# Patient Record
Sex: FEMALE | Race: WHITE | NOT HISPANIC OR LATINO | URBAN - METROPOLITAN AREA
[De-identification: names, ages, dates, MRNs, and addresses within clinical notes are randomized per-mention and may not be internally consistent; named-entity substitution may affect disease eponyms.]

---

## 2019-06-18 ENCOUNTER — OUTPATIENT (OUTPATIENT)
Dept: OUTPATIENT SERVICES | Facility: HOSPITAL | Age: 69
LOS: 1 days | End: 2019-06-18
Payer: MEDICARE

## 2019-06-18 PROCEDURE — 78072 PARATHYRD PLANAR W/SPECT&CT: CPT | Mod: 26

## 2019-06-18 PROCEDURE — 78072 PARATHYRD PLANAR W/SPECT&CT: CPT

## 2019-06-18 PROCEDURE — A9500: CPT

## 2019-06-18 PROCEDURE — A9512: CPT

## 2019-09-12 PROBLEM — Z00.00 ENCOUNTER FOR PREVENTIVE HEALTH EXAMINATION: Status: ACTIVE | Noted: 2019-09-12

## 2019-09-16 ENCOUNTER — APPOINTMENT (OUTPATIENT)
Dept: OTOLARYNGOLOGY | Facility: CLINIC | Age: 69
End: 2019-09-16
Payer: MEDICARE

## 2019-09-16 VITALS
BODY MASS INDEX: 35.36 KG/M2 | OXYGEN SATURATION: 95 % | SYSTOLIC BLOOD PRESSURE: 149 MMHG | DIASTOLIC BLOOD PRESSURE: 52 MMHG | WEIGHT: 220 LBS | TEMPERATURE: 98.1 F | HEIGHT: 66 IN | HEART RATE: 69 BPM

## 2019-09-16 DIAGNOSIS — Z86.39 PERSONAL HISTORY OF OTHER ENDOCRINE, NUTRITIONAL AND METABOLIC DISEASE: ICD-10-CM

## 2019-09-16 DIAGNOSIS — Z78.9 OTHER SPECIFIED HEALTH STATUS: ICD-10-CM

## 2019-09-16 DIAGNOSIS — Z82.49 FAMILY HISTORY OF ISCHEMIC HEART DISEASE AND OTHER DISEASES OF THE CIRCULATORY SYSTEM: ICD-10-CM

## 2019-09-16 DIAGNOSIS — K21.9 ACUTE LARYNGITIS: ICD-10-CM

## 2019-09-16 DIAGNOSIS — Z86.79 PERSONAL HISTORY OF OTHER DISEASES OF THE CIRCULATORY SYSTEM: ICD-10-CM

## 2019-09-16 DIAGNOSIS — Z80.9 FAMILY HISTORY OF MALIGNANT NEOPLASM, UNSPECIFIED: ICD-10-CM

## 2019-09-16 DIAGNOSIS — J04.0 ACUTE LARYNGITIS: ICD-10-CM

## 2019-09-16 DIAGNOSIS — E21.5 DISORDER OF PARATHYROID GLAND, UNSPECIFIED: ICD-10-CM

## 2019-09-16 PROCEDURE — 76536 US EXAM OF HEAD AND NECK: CPT

## 2019-09-16 PROCEDURE — 31575 DIAGNOSTIC LARYNGOSCOPY: CPT

## 2019-09-16 PROCEDURE — 99204 OFFICE O/P NEW MOD 45 MIN: CPT | Mod: 25

## 2019-09-16 RX ORDER — LEVOTHYROXINE SODIUM 0.17 MG/1
TABLET ORAL
Refills: 0 | Status: ACTIVE | COMMUNITY

## 2019-09-16 RX ORDER — ATENOLOL 50 MG/1
TABLET ORAL
Refills: 0 | Status: ACTIVE | COMMUNITY

## 2019-09-16 RX ORDER — ESCITALOPRAM OXALATE 5 MG/1
TABLET, FILM COATED ORAL
Refills: 0 | Status: ACTIVE | COMMUNITY

## 2019-09-30 ENCOUNTER — TRANSCRIPTION ENCOUNTER (OUTPATIENT)
Age: 69
End: 2019-09-30

## 2019-10-13 NOTE — REASON FOR VISIT
[FreeTextEntry2] : a surgical consultation concerning primary hyperparathyroidism, hypercalcemia and osteoporosis. [FreeTextEntry1] : Referrer is Acacia Monreal MD Endocrinologist,  PCP is same.

## 2019-10-13 NOTE — PROCEDURE
[Image(s) Captured] : image(s) captured and filed [Unable to Cooperate with Mirror] : patient unable to cooperate with mirror [Gag Reflex] : gag reflex preventing mirror examination [Topical Lidocaine] : topical lidocaine [Flexible Endoscope] : examined with the flexible endoscope [Serial Number: ___] : Serial Number: [unfilled] [Hoarseness] : hoarseness not clearly evaluated by indirect laryngoscopy [Globus] : globus [FreeTextEntry3] : \par [facility]\par THYROID/NECK ULTRASOUND REPORT\par \par NAME: LORI LARRY.        MR# [id number]	..              : 1950..	         DATE: 19\par \par HISTORY/ INDICATIONS: 69-year-old female with biochemical evidence of primary hyperparathyroidism to rule out a parathyroid adenoma suspected on Sestamibi scan and thyroid disease.\par \par COMPARISON: None\par \par PROCEDURE: Physician performed high-resolution ultrasound gray scale imaging and color Doppler supplementation of the thyroid gland and neck was obtained in the longitudinal and transverse planes using a 13 MHz linear transducer with image capture.  All measurements are in centimeters (longitudinal x AP x transverse).  \par \par FINDINGS: Overall the thyroid gland is atrophic, hypoechoic and  heterogeneous in echotexture with numerous hypoechoic micronodules and echogenic bands.  Vascularity is minimal on color Doppler flow. \par \par RIGHT LOBE: Is atrophic, heterogeneous, with decreased vascularity on color Doppler and measures 3.24 x 0.96 x 0.91 CM.  NODULES: None. \par \par ISTHMUS: Measures 0.16 CM in AP dimension and is heterogeneous in echotexture with decreased vascularity.  No nodules are identified.\par \par LEFT LOBE:  Is atrophic, heterogeneous, with decreased vascularity on color Doppler and measures 2.81 x 0.88 x 1.00 CM. NODULES: None.\par \par PARATHYROID GLANDS: Just below the left thyroid lobe is a hypoechoic, smoothly marginated structure with an echogenic line of separation and a vascular pedicle that measures 0.91 x 0.68 x 0.64 CM.  There are no other identified enlarged parathyroid glands in the central neck compartment. \par \par LYMPH NODES: There are several benign appearing subcentimeter lymph nodes identified at neck levels III bilaterally, all with echogenic hilar lines and a short long axis ratio < 0.5 in the transverse plane.\par \par IMPRESSION: A 69-year-old female with a atrophic hypoechoic heterogeneous thyroid gland consistent with long-standing autoimmune thyroiditis. There are no identified thyroid nodules. At the lower pole of the left thyroid lobe there is a 0.91 CM oblong structure that is consistent with a parathyroid adenoma and concordant with the Sestamibi scan findings. However, a reactive lymph node can also have this appearance associated with Hashimoto's thyroiditis.\par \par RECOMMENDATIONS: A left inferior parathyroid adenoma is suspected and further confirmation should be obtained with a 4-D CT scan prior to parathyroidectomy\par \par Electronically signed by Valente Bassett MD on 19, 6:00 PM.\par  [de-identified] : The nasal septum is minimally deviated to the right with a spur. There are no masses or polyps and the nasal mucosa and secretions are normal. There is a large left surgical ansotomy. The choanae and posterior nasopharynx are normal without masses or drainage. The Eustachian tube orifices appear patent. The pharynx, including the posterior and lateral pharyngeal walls, the vallecula and base of tongue are normal without ulcerations, lesions or masses. The hypopharynx including the pyriform sinuses open well without pooling of secretions, mucosal lesions or masses. The supraglottic larynx including the epiglottis, petiole, arytenoids, glossoepiglottic, aryepiglottic and pharyngoepiglottic folds are normal without mucosal lesions, ulcerations or masses. The glottis reveals normal false vocal folds. The true vocal folds are slightly thickened but tense and of equal length, without paralysis, having symmetric mobility on adduction and abduction. There are no mucosal lesions, nodules, cysts, erythroplasia or leukoplakia. The posterior cricoid area has healthy pink mucosa in the interarytenoid area and esophageal inlet. There is moderate thickening/edema of the interarytenoid mucosa suggestive of posterior laryngitis from laryngopharyngeal acid reflux disease. The trachea is clear without narrowing in the immediate subglottic region, without deviation or lesions. \par  [de-identified] : preoperative assessment

## 2019-10-13 NOTE — HISTORY OF PRESENT ILLNESS
[de-identified] : Oksana is a generally healthy 69-year-old female  who was first noted to have hypercalcema ~ 8 months ago. She is in otherwise good health. Her last serum calcium this month was 11.0 (ULN 10.4), iPTH of 130.7 pg/ml and a normal vitamin D 25 OH total at 36.5. She has normal renal function.  She denies history of nephrolithiasis but her last DEXA scan in March revealed osteoporosis with a T score in the left femoral neck of -2.7 with an interval decrease by -1.3 since 5/30/13. She also has osteopenia of the forearm, T score -1.9.  Oksana denies recent shortness of breath,  true dysphagia, anterior neck pain, neck pressure or mass. 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  There is no family history of thyroid cancer. She denies any known radiation exposures in her youth.  She denies calcium, vitamin D supplements, HCTZ or past use of Lithium Carbonate. There is no family history of nephrolithiasis or renal disease.  There is no history of fragility bone fractures. Other than generalized bone aches in her lumbar spine, memory loss, brain fog, polyuria/ polydipsia she denies severe fatigue, muscle weakness, joint pain, depression, nausea, vomiting, abdominal pain, constipation, nephrolithiasis, peptic ulcer disease, pancreatitis or GERD (only in the past managed with diet). She had a SPECT Sestamibi scan at [facility] on 06/18/19 suggestive of a left inferior parathyroid adenoma but not seen on the CT portion of the study. She also has long-standing Hashimoto's thyroiditis and this may confound the study with a hyperplastic lymph node related to autoimmune thyroiditis.  She has been on thyroid hormone replacement since age 17. She has gained ~  80 pounds  over the past 6 years.

## 2019-10-13 NOTE — CONSULT LETTER
[Dear  ___] : Dear  [unfilled], [Please see my note below.] : Please see my note below. [Consult Closing:] : Thank you very much for allowing me to participate in the care of this patient.  If you have any questions, please do not hesitate to contact me. [Sincerely,] : Sincerely, [FreeTextEntry3] : \par Valente Bassett M.D., FACS, ECNU\par Director Center for Thyroid & Parathyroid Surgery\par [facility] at [facility]\par Certified in Thyroid/Parathyroid/Neck Ultrasound, ECNU/ AIUM\par \par , Department of Otolaryngology\par [facility] [employer] [facility] at [facility]\par

## 2019-10-17 VITALS
TEMPERATURE: 98 F | HEIGHT: 66 IN | HEART RATE: 61 BPM | WEIGHT: 237 LBS | RESPIRATION RATE: 18 BRPM | SYSTOLIC BLOOD PRESSURE: 132 MMHG | DIASTOLIC BLOOD PRESSURE: 79 MMHG | OXYGEN SATURATION: 96 %

## 2019-10-17 NOTE — ASU PATIENT PROFILE, ADULT - REASON FOR ADMISSION, PROFILE
hypercalcemia, parathyroid adenoma, autoimmune thyroiditis; total thyroid lobectomy, parathyroidectomy, thymectomy

## 2019-10-17 NOTE — ASU PATIENT PROFILE, ADULT - PSH
H/O sinus surgery  2016  History of ectopic pregnancy    History of surgery  breast surgery  History of tonsillectomy H/O sinus surgery  2016  History of ectopic pregnancy    History of surgery  breast surgery, benign tumor excision, right  History of tonsillectomy

## 2019-10-17 NOTE — ASU PATIENT PROFILE, ADULT - PMH
Depression    Gallstone    Hypothyroidism    Obesity Depression    HTN (hypertension)    Hypothyroidism    Lower back pain    Lumbar disc herniation    Obesity    Sleep apnea

## 2019-10-17 NOTE — ASU PATIENT PROFILE, ADULT - VISION (WITH CORRECTIVE LENSES IF THE PATIENT USUALLY WEARS THEM):
Normal vision: sees adequately in most situations; can see medication labels, newsprint glasses distance/Normal vision: sees adequately in most situations; can see medication labels, newsprint

## 2019-10-18 ENCOUNTER — OUTPATIENT (OUTPATIENT)
Dept: OUTPATIENT SERVICES | Facility: HOSPITAL | Age: 69
LOS: 1 days | Discharge: HOME | End: 2019-10-18
Payer: MEDICARE

## 2019-10-18 ENCOUNTER — APPOINTMENT (OUTPATIENT)
Dept: OTOLARYNGOLOGY | Facility: HOSPITAL | Age: 69
End: 2019-10-18

## 2019-10-18 ENCOUNTER — RESULT REVIEW (OUTPATIENT)
Age: 69
End: 2019-10-18

## 2019-10-18 VITALS
OXYGEN SATURATION: 97 % | RESPIRATION RATE: 13 BRPM | DIASTOLIC BLOOD PRESSURE: 71 MMHG | SYSTOLIC BLOOD PRESSURE: 121 MMHG | HEART RATE: 84 BPM

## 2019-10-18 DIAGNOSIS — Z98.890 OTHER SPECIFIED POSTPROCEDURAL STATES: Chronic | ICD-10-CM

## 2019-10-18 DIAGNOSIS — Z87.59 PERSONAL HISTORY OF OTHER COMPLICATIONS OF PREGNANCY, CHILDBIRTH AND THE PUERPERIUM: Chronic | ICD-10-CM

## 2019-10-18 DIAGNOSIS — Z90.89 ACQUIRED ABSENCE OF OTHER ORGANS: Chronic | ICD-10-CM

## 2019-10-18 LAB
ALBUMIN SERPL ELPH-MCNC: 4.1 G/DL — SIGNIFICANT CHANGE UP (ref 3.3–5)
ALP SERPL-CCNC: 62 U/L — SIGNIFICANT CHANGE UP (ref 40–120)
ALT FLD-CCNC: 20 U/L — SIGNIFICANT CHANGE UP (ref 10–45)
ANION GAP SERPL CALC-SCNC: 13 MMOL/L — SIGNIFICANT CHANGE UP (ref 5–17)
AST SERPL-CCNC: 25 U/L — SIGNIFICANT CHANGE UP (ref 10–40)
BASOPHILS # BLD AUTO: 0.01 K/UL — SIGNIFICANT CHANGE UP (ref 0–0.2)
BASOPHILS NFR BLD AUTO: 0.1 % — SIGNIFICANT CHANGE UP (ref 0–2)
BILIRUB SERPL-MCNC: 0.4 MG/DL — SIGNIFICANT CHANGE UP (ref 0.2–1.2)
BUN SERPL-MCNC: 20 MG/DL — SIGNIFICANT CHANGE UP (ref 7–23)
CALCIUM SERPL-MCNC: 10.1 MG/DL — SIGNIFICANT CHANGE UP (ref 8.4–10.5)
CALCIUM SERPL-MCNC: 9.9 MG/DL — SIGNIFICANT CHANGE UP (ref 8.4–10.5)
CHLORIDE SERPL-SCNC: 106 MMOL/L — SIGNIFICANT CHANGE UP (ref 96–108)
CO2 SERPL-SCNC: 23 MMOL/L — SIGNIFICANT CHANGE UP (ref 22–31)
CREAT SERPL-MCNC: 0.7 MG/DL — SIGNIFICANT CHANGE UP (ref 0.5–1.3)
EOSINOPHIL # BLD AUTO: 0 K/UL — SIGNIFICANT CHANGE UP (ref 0–0.5)
EOSINOPHIL NFR BLD AUTO: 0 % — SIGNIFICANT CHANGE UP (ref 0–6)
GLUCOSE SERPL-MCNC: 138 MG/DL — HIGH (ref 70–99)
HCT VFR BLD CALC: 44 % — SIGNIFICANT CHANGE UP (ref 34.5–45)
HGB BLD-MCNC: 14.1 G/DL — SIGNIFICANT CHANGE UP (ref 11.5–15.5)
IMM GRANULOCYTES NFR BLD AUTO: 0.5 % — SIGNIFICANT CHANGE UP (ref 0–1.5)
LYMPHOCYTES # BLD AUTO: 0.78 K/UL — LOW (ref 1–3.3)
LYMPHOCYTES # BLD AUTO: 5.9 % — LOW (ref 13–44)
MCHC RBC-ENTMCNC: 29.3 PG — SIGNIFICANT CHANGE UP (ref 27–34)
MCHC RBC-ENTMCNC: 32 GM/DL — SIGNIFICANT CHANGE UP (ref 32–36)
MCV RBC AUTO: 91.5 FL — SIGNIFICANT CHANGE UP (ref 80–100)
MONOCYTES # BLD AUTO: 0.12 K/UL — SIGNIFICANT CHANGE UP (ref 0–0.9)
MONOCYTES NFR BLD AUTO: 0.9 % — LOW (ref 2–14)
NEUTROPHILS # BLD AUTO: 12.29 K/UL — HIGH (ref 1.8–7.4)
NEUTROPHILS NFR BLD AUTO: 92.6 % — HIGH (ref 43–77)
NRBC # BLD: 0 /100 WBCS — SIGNIFICANT CHANGE UP (ref 0–0)
PLATELET # BLD AUTO: 237 K/UL — SIGNIFICANT CHANGE UP (ref 150–400)
POTASSIUM SERPL-MCNC: 4.4 MMOL/L — SIGNIFICANT CHANGE UP (ref 3.5–5.3)
POTASSIUM SERPL-SCNC: 4.4 MMOL/L — SIGNIFICANT CHANGE UP (ref 3.5–5.3)
PROT SERPL-MCNC: 7.3 G/DL — SIGNIFICANT CHANGE UP (ref 6–8.3)
PTH-INTACT IO % DIF SERPL: 178.1 PG/ML — HIGH (ref 8.5–72.5)
PTH-INTACT IO % DIF SERPL: 18.4 PG/ML — SIGNIFICANT CHANGE UP (ref 8.5–72.5)
PTH-INTACT IO % DIF SERPL: 34.2 PG/ML — SIGNIFICANT CHANGE UP (ref 8.5–72.5)
PTH-INTACT IO % DIF SERPL: 49.8 PG/ML — SIGNIFICANT CHANGE UP (ref 8.5–72.5)
RBC # BLD: 4.81 M/UL — SIGNIFICANT CHANGE UP (ref 3.8–5.2)
RBC # FLD: 13 % — SIGNIFICANT CHANGE UP (ref 10.3–14.5)
SODIUM SERPL-SCNC: 142 MMOL/L — SIGNIFICANT CHANGE UP (ref 135–145)
WBC # BLD: 12.8 K/UL — HIGH (ref 3.8–10.5)
WBC # FLD AUTO: 12.8 K/UL — HIGH (ref 3.8–10.5)

## 2019-10-18 PROCEDURE — 38510 BIOPSY/REMOVAL LYMPH NODES: CPT

## 2019-10-18 PROCEDURE — 88305 TISSUE EXAM BY PATHOLOGIST: CPT

## 2019-10-18 PROCEDURE — 85027 COMPLETE CBC AUTOMATED: CPT

## 2019-10-18 PROCEDURE — 38510 BIOPSY/REMOVAL LYMPH NODES: CPT | Mod: GC

## 2019-10-18 PROCEDURE — 82310 ASSAY OF CALCIUM: CPT

## 2019-10-18 PROCEDURE — 80053 COMPREHEN METABOLIC PANEL: CPT

## 2019-10-18 PROCEDURE — 36415 COLL VENOUS BLD VENIPUNCTURE: CPT

## 2019-10-18 PROCEDURE — 88307 TISSUE EXAM BY PATHOLOGIST: CPT

## 2019-10-18 PROCEDURE — 60500 EXPLORE PARATHYROID GLANDS: CPT | Mod: GC

## 2019-10-18 PROCEDURE — 88331 PATH CONSLTJ SURG 1 BLK 1SPC: CPT

## 2019-10-18 PROCEDURE — 83970 ASSAY OF PARATHORMONE: CPT

## 2019-10-18 PROCEDURE — 60500 EXPLORE PARATHYROID GLANDS: CPT

## 2019-10-18 RX ORDER — HYDROMORPHONE HYDROCHLORIDE 2 MG/ML
0.5 INJECTION INTRAMUSCULAR; INTRAVENOUS; SUBCUTANEOUS
Refills: 0 | Status: DISCONTINUED | OUTPATIENT
Start: 2019-10-18 | End: 2019-10-18

## 2019-10-18 RX ORDER — METOPROLOL TARTRATE 50 MG
5 TABLET ORAL ONCE
Refills: 0 | Status: COMPLETED | OUTPATIENT
Start: 2019-10-18 | End: 2019-10-18

## 2019-10-18 RX ORDER — ACETAMINOPHEN 500 MG
650 TABLET ORAL ONCE
Refills: 0 | Status: DISCONTINUED | OUTPATIENT
Start: 2019-10-18 | End: 2019-10-19

## 2019-10-18 RX ADMIN — Medication 5 MILLIGRAM(S): at 17:12

## 2019-10-18 RX ADMIN — HYDROMORPHONE HYDROCHLORIDE 0.5 MILLIGRAM(S): 2 INJECTION INTRAMUSCULAR; INTRAVENOUS; SUBCUTANEOUS at 16:35

## 2019-10-18 NOTE — PROGRESS NOTE ADULT - ASSESSMENT
Assessment:69y Female s/p partial parathyroidectomy, R inferior lobe.    Plan:  - Pain/nausea control PRN  - Regular diet  - 730 PM labs, text [place]  - Dispo: Likely home after pulling TIMO

## 2019-10-18 NOTE — BRIEF OPERATIVE NOTE - OPERATION/FINDINGS
Horizontal incision made on  neck, dissected down to adipose tissue, dissected through platysma. R inferior parathyroid adenoma resected. R superior parathyroid initially not seen but identified more laterally on later inspection. Left inferior parathyroid appeared normal, L superior parathyroid gland appeared large, resected 1/2 of gland. All remaining parathyroids marked with metal clips. Closed in layers, drain left in place.

## 2019-10-18 NOTE — PROGRESS NOTE ADULT - SUBJECTIVE AND OBJECTIVE BOX
POST-OP CHECK    Pre-Op Dx: E83.52,D35.1,E06.3  Parathyroid adenoma    Procedure: Partial Parathyroidectomy, R inferior lobe    Surgeon: Danyelle    Subjective: Pt states she is doing well. Pain is controlled. No nausea, vomiting, chest pain, shortness of breath.      Vital Signs Last 24 Hrs  T(C): 36.1 (18 Oct 2019 16:20), Max: 36.1 (18 Oct 2019 16:20)  T(F): 97 (18 Oct 2019 16:20), Max: 97 (18 Oct 2019 16:20)  HR: 74 (18 Oct 2019 17:50) (72 - 86)  BP: 147/70 (18 Oct 2019 17:50) (147/70 - 177/75)  BP(mean): 100 (18 Oct 2019 17:50) (99 - 109)  RR: 15 (18 Oct 2019 17:50) (11 - 18)  SpO2: 98% (18 Oct 2019 17:50) (98% - 100%)      Physical Exam:  General: NAD, resting comfortably in bed  HEENT: Neck soft. Dressing in place, c/d/i. TIMO with minimal serosanguinous output.  Pulmonary: Nonlabored breathing, no respiratory distress. CTA b/l.  Cardiovascular: NSR  Extremities: WWP  Neuro: A/O x 3, CNs II-XII grossly intact, normal motor/sensation, no focal deficits

## 2019-10-21 PROBLEM — F32.9 MAJOR DEPRESSIVE DISORDER, SINGLE EPISODE, UNSPECIFIED: Chronic | Status: ACTIVE | Noted: 2019-10-17

## 2019-10-21 PROBLEM — M51.26 OTHER INTERVERTEBRAL DISC DISPLACEMENT, LUMBAR REGION: Chronic | Status: ACTIVE | Noted: 2019-10-17

## 2019-10-21 PROBLEM — M54.5 LOW BACK PAIN: Chronic | Status: ACTIVE | Noted: 2019-10-17

## 2019-10-21 PROBLEM — G47.30 SLEEP APNEA, UNSPECIFIED: Chronic | Status: ACTIVE | Noted: 2019-10-17

## 2019-10-21 PROBLEM — E03.9 HYPOTHYROIDISM, UNSPECIFIED: Chronic | Status: ACTIVE | Noted: 2019-10-17

## 2019-10-21 PROBLEM — I10 ESSENTIAL (PRIMARY) HYPERTENSION: Chronic | Status: ACTIVE | Noted: 2019-10-17

## 2019-10-21 PROBLEM — E66.9 OBESITY, UNSPECIFIED: Chronic | Status: ACTIVE | Noted: 2019-10-17

## 2019-10-22 LAB — SURGICAL PATHOLOGY STUDY: SIGNIFICANT CHANGE UP

## 2019-10-28 ENCOUNTER — APPOINTMENT (OUTPATIENT)
Dept: OTOLARYNGOLOGY | Facility: CLINIC | Age: 69
End: 2019-10-28
Payer: MEDICARE

## 2019-10-28 VITALS
SYSTOLIC BLOOD PRESSURE: 143 MMHG | HEART RATE: 62 BPM | OXYGEN SATURATION: 96 % | DIASTOLIC BLOOD PRESSURE: 82 MMHG | RESPIRATION RATE: 17 BRPM | TEMPERATURE: 98 F

## 2019-10-28 DIAGNOSIS — E83.52 HYPERCALCEMIA: ICD-10-CM

## 2019-10-28 DIAGNOSIS — Z86.39 PERSONAL HISTORY OF OTHER ENDOCRINE, NUTRITIONAL AND METABOLIC DISEASE: ICD-10-CM

## 2019-10-28 DIAGNOSIS — Z87.898 PERSONAL HISTORY OF OTHER SPECIFIED CONDITIONS: ICD-10-CM

## 2019-10-28 PROCEDURE — 31575 DIAGNOSTIC LARYNGOSCOPY: CPT | Mod: 58

## 2019-10-28 PROCEDURE — 99024 POSTOP FOLLOW-UP VISIT: CPT

## 2019-10-28 RX ORDER — ACETAMINOPHEN AND CODEINE 300; 30 MG/1; MG/1
300-30 TABLET ORAL
Qty: 12 | Refills: 0 | Status: COMPLETED | COMMUNITY
Start: 2019-10-15 | End: 2019-10-28

## 2019-10-28 RX ORDER — AZITHROMYCIN 250 MG/1
250 TABLET, FILM COATED ORAL
Qty: 1 | Refills: 0 | Status: COMPLETED | COMMUNITY
Start: 2019-10-15 | End: 2019-10-28

## 2019-10-28 NOTE — HISTORY OF PRESENT ILLNESS
[de-identified] : Oksana is a generally healthy 69-year-old female  who was first noted to have hypercalcema ~ 8 months ago. She is in otherwise good health. Her last serum calcium this month was 11.0 (ULN 10.4), iPTH of 130.7 pg/ml and a normal vitamin D 25 OH total at 36.5. She has normal renal function.  She denies history of nephrolithiasis but her last DEXA scan in March revealed osteoporosis with a T score in the left femoral neck of -2.7 with an interval decrease by -1.3 since 5/30/13. She also has osteopenia of the forearm, T score -1.9.  Oksana denies recent shortness of breath,  true dysphagia, anterior neck pain, neck pressure or mass. 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  There is no family history of thyroid cancer. She denies any known radiation exposures in her youth.  She denies calcium, vitamin D supplements, HCTZ or past use of Lithium Carbonate. There is no family history of nephrolithiasis or renal disease.  There is no history of fragility bone fractures. Other than generalized bone aches in her lumbar spine, memory loss, brain fog, polyuria/ polydipsia she denies severe fatigue, muscle weakness, joint pain, depression, nausea, vomiting, abdominal pain, constipation, nephrolithiasis, peptic ulcer disease, pancreatitis or GERD (only in the past managed with diet). She had a SPECT Sestamibi scan [facility] on 06/18/19 suggestive of a left inferior parathyroid adenoma but not seen on the CT portion of the study. She also has long-standing Hashimoto's thyroiditis and this may confound the study with a hyperplastic lymph node related to autoimmune thyroiditis.  She has been on thyroid hormone replacement since age 17. She has gained ~  80 pounds  over the past 6 years.   \par  [FreeTextEntry1] : Oksana returns today after an uneventful subtotal parathyroidectomy on 10/18/19.  She had a 0.86 g, 2.1 cm right lower parathyroid adenoma and partial resection of a borderline enlarged left upper parathyroid gland.  She had an appropriate IOPTH drop into the normal range (178.1 to 34.2 pg/ml).  She denies paresthesias and her voice is minimally hoarse.

## 2019-10-28 NOTE — PROCEDURE
[Image(s) Captured] : image(s) captured and filed [Unable to Cooperate with Mirror] : patient unable to cooperate with mirror [Gag Reflex] : gag reflex preventing mirror examination [Hoarseness] : hoarseness not clearly evaluated by indirect laryngoscopy [Globus] : globus [Topical Lidocaine] : topical lidocaine [Flexible Endoscope] : examined with the flexible endoscope [Serial Number: ___] : Serial Number: [unfilled] [de-identified] : The nasal septum is minimally deviated to the right with a spur. There are no masses or polyps and the nasal mucosa and secretions are normal. There is a large left surgical ansotomy. The choanae and posterior nasopharynx are normal without masses or drainage. The Eustachian tube orifices appear patent. The pharynx, including the posterior and lateral pharyngeal walls, the vallecula and base of tongue are normal without ulcerations, lesions or masses. The hypopharynx including the pyriform sinuses open well without pooling of secretions, mucosal lesions or masses. The supraglottic larynx including the epiglottis, petiole, arytenoids, glossoepiglottic, aryepiglottic and pharyngoepiglottic folds are normal without mucosal lesions, ulcerations or masses. The glottis reveals normal false vocal folds. The true vocal folds are hyperemic but tense and of equal length, without paralysis, having symmetric mobility on adduction and abduction. There are no mucosal lesions, nodules, cysts, erythroplasia or leukoplakia. The posterior cricoid area has healthy pink mucosa in the interarytenoid area and esophageal inlet. There is minimal thickening/edema of the interarytenoid mucosa suggestive of posterior laryngitis from laryngopharyngeal acid reflux disease. The trachea is clear without narrowing in the immediate subglottic region, without deviation or lesions. \par  [de-identified] : postoperative assessment

## 2019-10-28 NOTE — CONSULT LETTER
[Dear  ___] : Dear  [unfilled], [Consult Letter:] : I had the pleasure of evaluating your patient, [unfilled]. [Please see my note below.] : Please see my note below. [Consult Closing:] : Thank you very much for allowing me to participate in the care of this patient.  If you have any questions, please do not hesitate to contact me. [Sincerely,] : Sincerely, [FreeTextEntry3] : \par Valente Bassett M.D., FACS, ECNU\par Director Center for Thyroid & Parathyroid Surgery\par [facility] [place] at [facility]\par Certified in Thyroid/Parathyroid/Neck Ultrasound, ECNU/ AIUM\par \par , Department of Otolaryngology\par [facility] at [facility]\par

## 2019-10-28 NOTE — PHYSICAL EXAM
[Normal] : no mass and no adenopathy [de-identified] : The neck is flat without seroma or hematoma. The subcuticular suture was removed. The voice is raspy.  A Chvostek sign was not present.

## 2019-10-29 LAB
ALBUMIN SERPL ELPH-MCNC: 4.2 G/DL
ALP BLD-CCNC: 62 U/L
ALT SERPL-CCNC: 15 U/L
ANION GAP SERPL CALC-SCNC: 14 MMOL/L
AST SERPL-CCNC: 16 U/L
BILIRUB SERPL-MCNC: 0.5 MG/DL
BUN SERPL-MCNC: 28 MG/DL
CALCIUM SERPL-MCNC: 9.3 MG/DL
CALCIUM SERPL-MCNC: 9.3 MG/DL
CHLORIDE SERPL-SCNC: 103 MMOL/L
CO2 SERPL-SCNC: 24 MMOL/L
CREAT SERPL-MCNC: 0.8 MG/DL
GLUCOSE SERPL-MCNC: 81 MG/DL
PARATHYROID HORMONE INTACT: 52 PG/ML
POTASSIUM SERPL-SCNC: 4.6 MMOL/L
PROT SERPL-MCNC: 6.5 G/DL
SODIUM SERPL-SCNC: 141 MMOL/L

## 2019-10-31 LAB — 25(OH)D3 SERPL-MCNC: 34.9 NG/ML

## 2020-01-28 ENCOUNTER — APPOINTMENT (OUTPATIENT)
Dept: OTOLARYNGOLOGY | Facility: CLINIC | Age: 70
End: 2020-01-28
Payer: MEDICARE

## 2020-01-28 VITALS
OXYGEN SATURATION: 97 % | TEMPERATURE: 98.3 F | HEART RATE: 69 BPM | SYSTOLIC BLOOD PRESSURE: 125 MMHG | DIASTOLIC BLOOD PRESSURE: 79 MMHG

## 2020-01-28 DIAGNOSIS — E21.0 PRIMARY HYPERPARATHYROIDISM: ICD-10-CM

## 2020-01-28 DIAGNOSIS — Z98.890 OTHER SPECIFIED POSTPROCEDURAL STATES: ICD-10-CM

## 2020-01-28 DIAGNOSIS — D35.1 BENIGN NEOPLASM OF PARATHYROID GLAND: ICD-10-CM

## 2020-01-28 DIAGNOSIS — R09.89 OTHER SPECIFIED SYMPTOMS AND SIGNS INVOLVING THE CIRCULATORY AND RESPIRATORY SYSTEMS: ICD-10-CM

## 2020-01-28 DIAGNOSIS — M81.0 AGE-RELATED OSTEOPOROSIS W/OUT CURRENT PATHOLOGICAL FRACTURE: ICD-10-CM

## 2020-01-28 DIAGNOSIS — R49.9 UNSPECIFIED VOICE AND RESONANCE DISORDER: ICD-10-CM

## 2020-01-28 DIAGNOSIS — G47.33 OBSTRUCTIVE SLEEP APNEA (ADULT) (PEDIATRIC): ICD-10-CM

## 2020-01-28 DIAGNOSIS — E06.3 AUTOIMMUNE THYROIDITIS: ICD-10-CM

## 2020-01-28 PROCEDURE — 31575 DIAGNOSTIC LARYNGOSCOPY: CPT

## 2020-01-28 PROCEDURE — 99214 OFFICE O/P EST MOD 30 MIN: CPT | Mod: 25

## 2020-01-28 NOTE — HISTORY OF PRESENT ILLNESS
[de-identified] : Oksana is a generally healthy 69-year-old female  who was first noted to have hypercalcemia ~ 8 months ago. She is in otherwise good health. Her last serum calcium this month was 11.0 (ULN 10.4), iPTH of 130.7 pg/ml and a normal vitamin D 25 OH total at 36.5. She has normal renal function.  She denies history of nephrolithiasis but her last DEXA scan in March revealed osteoporosis with a T score in the left femoral neck of -2.7 with an interval decrease by -1.3 since 5/30/13. She also has osteopenia of the forearm, T score -1.9.  Oksana denies recent shortness of breath,  true dysphagia, anterior neck pain, neck pressure or mass. 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  There is no family history of thyroid cancer. She denies any known radiation exposures in her youth.  She denies calcium, vitamin D supplements, HCTZ or past use of Lithium Carbonate. There is no family history of nephrolithiasis or renal disease.  There is no history of fragility bone fractures. Other than generalized bone aches in her lumbar spine, memory loss, brain fog, polyuria/ polydipsia she denies severe fatigue, muscle weakness, joint pain, depression, nausea, vomiting, abdominal pain, constipation, nephrolithiasis, peptic ulcer disease, pancreatitis or GERD (only in the past managed with diet). She had a SPECT Sestamibi scan at [facility] on 06/18/19 suggestive of a left inferior parathyroid adenoma but not seen on the CT portion of the study. She also has long-standing Hashimoto's thyroiditis and this may confound the study with a hyperplastic lymph node related to autoimmune thyroiditis.  She has been on thyroid hormone replacement since age 17. She has gained ~  80 pounds  over the past 6 years.   \par  [FreeTextEntry1] : Oksana returns today after an uneventful subtotal parathyroidectomy on 10/18/19.  She had a 0.86 g, 2.1 cm right lower parathyroid adenoma and partial resection of a borderline enlarged left upper parathyroid gland.  She had an appropriate IOPTH drop into the normal range (178.1 to 34.2 pg/ml).  Post op labs calcium/PTH 9.3/52.  She is taking 1 Citracal daily and vitamin D3 2,000 IU.  Her voice is minimally hoarse on an intermittent basis.  She is due for another DEXA scan on 2021. She has autoimmune thyroiditis and taking LT4 150 mcgs + LT3 5 mcg  x 7 days.  She had seen Dr. Monreal post op but since she moved to [facility] can no longer f/u with her.

## 2020-01-28 NOTE — REASON FOR VISIT
[FreeTextEntry2] : a followup visit after parathyroidectomy for primary HPT and osteoporosis. [FreeTextEntry1] : Referrer is Acacia Monreal MD Endocrinologist,  PCP is same.

## 2020-01-28 NOTE — CONSULT LETTER
[Dear  ___] : Dear  [unfilled], [Consult Letter:] : I had the pleasure of evaluating your patient, [unfilled]. [Consult Closing:] : Thank you very much for allowing me to participate in the care of this patient.  If you have any questions, please do not hesitate to contact me. [Please see my note below.] : Please see my note below. [Sincerely,] : Sincerely, [FreeTextEntry3] : \par Valente Bassett M.D., FACS, ECNU\par Director Center for Thyroid & Parathyroid Surgery\par [facility]\par Certified in Thyroid/Parathyroid/Neck Ultrasound, ECNU/ AIUM\par \par , Department of Otolaryngology\par [facility] at [facility]\par

## 2020-01-28 NOTE — PROCEDURE
[Image(s) Captured] : image(s) captured and filed [Unable to Cooperate with Mirror] : patient unable to cooperate with mirror [Gag Reflex] : gag reflex preventing mirror examination [Hoarseness] : hoarseness not clearly evaluated by indirect laryngoscopy [Globus] : globus [Flexible Endoscope] : examined with the flexible endoscope [Topical Lidocaine] : topical lidocaine [Serial Number: ___] : Serial Number: [unfilled] [de-identified] : chronic PND intermittent hoarseness and globus sensation [de-identified] : The nasal septum is minimally deviated to the right with a spur. There are no masses or polyps and the nasal mucosa and secretions are normal. There is a large left surgical ansotomy. The choanae and posterior nasopharynx are normal without masses or drainage. The Eustachian tube orifices appear patent. The pharynx, including the posterior and lateral pharyngeal walls, the vallecula and base of tongue are normal without ulcerations, lesions or masses. The hypopharynx including the pyriform sinuses open well without pooling of secretions, mucosal lesions or masses. The supraglottic larynx including the epiglottis, petiole, arytenoids, glossoepiglottic, aryepiglottic and pharyngoepiglottic folds are normal without mucosal lesions, ulcerations or masses. The glottis reveals normal false vocal folds. The true vocal folds are slightly thickened but tense and of equal length, without paralysis, having symmetric mobility on adduction and abduction. There are no mucosal lesions, nodules, cysts, erythroplasia or leukoplakia. The posterior cricoid area has healthy pink mucosa in the interarytenoid area and esophageal inlet. There is moderate thickening/edema of the interarytenoid mucosa suggestive of posterior laryngitis from laryngopharyngeal acid reflux disease. The trachea is clear without narrowing in the immediate subglottic region, without deviation or lesions. \par

## 2020-02-06 LAB
25(OH)D3 SERPL-MCNC: 31.6 NG/ML
CALCIUM SERPL-MCNC: 9.6 MG/DL
PARATHYROID HORMONE INTACT: 69 PG/ML
T3FREE SERPL-MCNC: 2.77 PG/ML
T4 FREE SERPL-MCNC: 1.3 NG/DL
TSH SERPL-ACNC: 1.16 UIU/ML

## 2020-10-21 ENCOUNTER — APPOINTMENT (OUTPATIENT)
Dept: SLEEP CENTER | Facility: HOSPITAL | Age: 70
End: 2020-10-21

## 2021-11-02 NOTE — ASU PATIENT PROFILE, ADULT - PAIN LOCATION, PROFILE
AxOx4 Pt in for SOB    Onset 1 week ago    SOB on exertion              IV established and flushed for patency with no signs of infiltration.      Labs obtained and labeled appropriately    Pt placed on cardiac monitor    EKG obtained lumbar

## 2022-04-11 PROBLEM — J04.0 REFLUX LARYNGITIS: Status: ACTIVE | Noted: 2019-09-16
